# Patient Record
Sex: MALE | URBAN - METROPOLITAN AREA
[De-identification: names, ages, dates, MRNs, and addresses within clinical notes are randomized per-mention and may not be internally consistent; named-entity substitution may affect disease eponyms.]

---

## 2024-02-27 ENCOUNTER — NURSING HOME VISIT (OUTPATIENT)
Dept: POST ACUTE CARE | Facility: EXTERNAL LOCATION | Age: 74
End: 2024-02-27
Payer: MEDICARE

## 2024-02-27 DIAGNOSIS — B44.81 ALLERGIC BRONCHOPULMONARY ASPERGILLOSIS (MULTI): Primary | ICD-10-CM

## 2024-02-27 DIAGNOSIS — J96.21 ACUTE AND CHRONIC RESPIRATORY FAILURE WITH HYPOXIA (MULTI): ICD-10-CM

## 2024-02-27 DIAGNOSIS — J44.9 CHRONIC OBSTRUCTIVE PULMONARY DISEASE, UNSPECIFIED COPD TYPE (MULTI): ICD-10-CM

## 2024-02-27 DIAGNOSIS — S22.000D CLOSED WEDGE FRACTURE OF THORACIC VERTEBRA WITH ROUTINE HEALING, UNSPECIFIED THORACIC VERTEBRAL LEVEL, SUBSEQUENT ENCOUNTER: ICD-10-CM

## 2024-02-27 DIAGNOSIS — Z79.52 LONG TERM CURRENT USE OF SYSTEMIC STEROIDS: ICD-10-CM

## 2024-02-27 DIAGNOSIS — M81.0 OSTEOPOROSIS, UNSPECIFIED OSTEOPOROSIS TYPE, UNSPECIFIED PATHOLOGICAL FRACTURE PRESENCE: ICD-10-CM

## 2024-02-27 PROCEDURE — 99497 ADVNCD CARE PLAN 30 MIN: CPT | Performed by: STUDENT IN AN ORGANIZED HEALTH CARE EDUCATION/TRAINING PROGRAM

## 2024-02-27 PROCEDURE — 99305 1ST NF CARE MODERATE MDM 35: CPT | Performed by: STUDENT IN AN ORGANIZED HEALTH CARE EDUCATION/TRAINING PROGRAM

## 2024-02-27 NOTE — LETTER
Patient: Maxime Gould  : 1950    Encounter Date: 2024    Date of Service: 24      HPI/Subjective  Maxime Gould is a 73 y.o. male with past medical history of pulmonary aspergillosis, COPD, congestive heart failure, chronic hypoxic respiratory failure.  He was initially admitted to outside hospital for evaluation of congestive heart failure and COPD exacerbation versus worsening chronic aspergillosis.  He was treated with IV Lasix, steroids, voriconazole.  He is currently admitted to [facility] for skilled rehabilitation.  Of note patient also has an acute T8-T9 fracture for which she has been seen by neurosurgery and a brace has been recommended.  Currently he is doing well tolerating plan of care.  Currently on steroid taper for COPD exacerbation.  Pain is well-controlled.  Doing well on 2 L nasal cannula.  Continues on daily voriconazole.  Currently denies any worsening shortness of breath, chest pain, worsening lower extremity edema.  Tolerating PTOT appropriately no other complaints or concerns at this time.    Denies new/worsening fevers, chills, weight loss, lightheadedness, dizziness, vision changes, sore throat, runny nose, CP, SOB, cough, palpitations, n/v/d, abd pain, black/bloody stools, or new numbness/weakness/tingling in arms/legs/face.      Other Medical, Surgical, Family, Social Hx, Allergies per chart in PCC.   Medication list reviewed. Please see PCC.     Objective:   Physical Exam     Vital signs reviewed. Please see chart in PCC.     General: NAD. NCAT. AOx3  HEENT: PERRLA. EOMI. MMM. Nares patent bl.  Cardiovascular: RRR. S1/S2 wnl.   Respiratory: BL scattered rhonchi. No acute respiratory distress. On 2-3L NC O2.   GI: Soft, NT abdomen.   MSK: Generalized weakness. T8-T9 fx with mild to moderate tenderness.   Extremities: Trace BLLE edema.   Skin: No visible rashes or bruises.   Neuro: Cranial Nerves grossly intact. Motor/sensory wnl.   Psych: Mood wnl.          REVIEW OF  SYSTEMS   ROS reviewed within HPI and is otherwise negative       Assessment and Plan  Encounter Diagnoses   Name Primary?   • Allergic bronchopulmonary aspergillosis (CMS/HCC) Yes   • Acute and chronic respiratory failure with hypoxia (CMS/HCC)    • Chronic obstructive pulmonary disease, unspecified COPD type (CMS/HCC)    • Closed wedge fracture of thoracic vertebra with routine healing, unspecified thoracic vertebral level, subsequent encounter    • Osteoporosis, unspecified osteoporosis type, unspecified pathological fracture presence    • Long term current use of systemic steroids        -Continue current plan of care  - Patient on voriconazole for chronic pulmonary aspergillosis  - Patient on long-term systemic steroids for COPD and pulmonary aspergillosis.  Monitor for new or worsening factors.  Has received Zometa prior to arrival.  - Follow-up with neurosurgery as recommended  - Supplemental O2 as needed  - Monitor mentation  - Continue home inhalers  -Pre-Admission hospital notes reviewed  -Pertinent radiology, if any, reviewed   -Current rehab plan reviewed; continue pending any major changes  -Current medication therapy reviewed. Continue and monitor for adverse effects.  -Monitor vitals  -Monitor labs  -Continue home medications for chronic medical conditions.   -PT/OT as tolerated  -Low carb, Low sodium, Low fat diet advised       ACP Discussed: Patient to remain full code.       Charting was completed using voice recognition technology and may include unintended errors.    Saleem Manrique MD     Electronically Signed By: Saleem Manrique MD   3/5/24 11:28 PM

## 2024-03-06 NOTE — PROGRESS NOTES
Date of Service: 2/27/24      HPI/Subjective  Maxime Gould is a 73 y.o. male with past medical history of pulmonary aspergillosis, COPD, congestive heart failure, chronic hypoxic respiratory failure.  He was initially admitted to outside hospital for evaluation of congestive heart failure and COPD exacerbation versus worsening chronic aspergillosis.  He was treated with IV Lasix, steroids, voriconazole.  He is currently admitted to [facility] for skilled rehabilitation.  Of note patient also has an acute T8-T9 fracture for which she has been seen by neurosurgery and a brace has been recommended.  Currently he is doing well tolerating plan of care.  Currently on steroid taper for COPD exacerbation.  Pain is well-controlled.  Doing well on 2 L nasal cannula.  Continues on daily voriconazole.  Currently denies any worsening shortness of breath, chest pain, worsening lower extremity edema.  Tolerating PTOT appropriately no other complaints or concerns at this time.    Denies new/worsening fevers, chills, weight loss, lightheadedness, dizziness, vision changes, sore throat, runny nose, CP, SOB, cough, palpitations, n/v/d, abd pain, black/bloody stools, or new numbness/weakness/tingling in arms/legs/face.      Other Medical, Surgical, Family, Social Hx, Allergies per chart in PCC.   Medication list reviewed. Please see PCC.     Objective:   Physical Exam     Vital signs reviewed. Please see chart in PCC.     General: NAD. NCAT. AOx3  HEENT: PERRLA. EOMI. MMM. Nares patent bl.  Cardiovascular: RRR. S1/S2 wnl.   Respiratory: BL scattered rhonchi. No acute respiratory distress. On 2-3L NC O2.   GI: Soft, NT abdomen.   MSK: Generalized weakness. T8-T9 fx with mild to moderate tenderness.   Extremities: Trace BLLE edema.   Skin: No visible rashes or bruises.   Neuro: Cranial Nerves grossly intact. Motor/sensory wnl.   Psych: Mood wnl.          REVIEW OF SYSTEMS   ROS reviewed within HPI and is otherwise negative       Assessment  and Plan  Encounter Diagnoses   Name Primary?    Allergic bronchopulmonary aspergillosis (CMS/HCC) Yes    Acute and chronic respiratory failure with hypoxia (CMS/HCC)     Chronic obstructive pulmonary disease, unspecified COPD type (CMS/HCC)     Closed wedge fracture of thoracic vertebra with routine healing, unspecified thoracic vertebral level, subsequent encounter     Osteoporosis, unspecified osteoporosis type, unspecified pathological fracture presence     Long term current use of systemic steroids        -Continue current plan of care  - Patient on voriconazole for chronic pulmonary aspergillosis  - Patient on long-term systemic steroids for COPD and pulmonary aspergillosis.  Monitor for new or worsening factors.  Has received Zometa prior to arrival.  - Follow-up with neurosurgery as recommended  - Supplemental O2 as needed  - Monitor mentation  - Continue home inhalers  -Pre-Admission hospital notes reviewed  -Pertinent radiology, if any, reviewed   -Current rehab plan reviewed; continue pending any major changes  -Current medication therapy reviewed. Continue and monitor for adverse effects.  -Monitor vitals  -Monitor labs  -Continue home medications for chronic medical conditions.   -PT/OT as tolerated  -Low carb, Low sodium, Low fat diet advised       ACP Discussed: Patient to remain full code.       Charting was completed using voice recognition technology and may include unintended errors.    Saleem Manrique MD